# Patient Record
(demographics unavailable — no encounter records)

---

## 2025-04-15 NOTE — DISCUSSION/SUMMARY
[Normal Growth] : growth [Normal Development] : development  [No Elimination Concerns] : elimination [Continue Regimen] : feeding [No Skin Concerns] : skin [Normal Sleep Pattern] : sleep [None] : no medical problems [Anticipatory Guidance Given] : Anticipatory guidance addressed as per the history of present illness section [School] : school [Development and Mental Health] : development and mental health [Nutrition and Physical Activity] : nutrition and physical activity [Oral Health] : oral health [Safety] : safety [No Vaccines] : no vaccines needed [No Medications] : ~He/She~ is not on any medications [Patient] : patient [Parent/Guardian] : Parent/Guardian [Full Activity without restrictions including Physical Education & Athletics] : Full Activity without restrictions including Physical Education & Athletics [FreeTextEntry1] : Continue balanced diet with all food groups. Brush teeth twice a day with toothbrush. Recommend visit to dentist. Help child to maintain consistent daily routines and sleep schedule. Personal hygiene and puberty explained. School discussed. Ensure home is safe. Teach child about personal safety. Use consistent, positive discipline. Limit screen time to no more than 2 hours per day. Encourage physical activity. Return 1 year for routine well child check. 5-2-1-0 questionnaire reviewed and I discussed components of 5-2-1-0 healthy living with patient and family.  Recommended 5 servings of fruits and vegetables a day, less than 2 hours of screen time per day, 1 hour of exercise per day and zero sugar sweetened beverages. Parent(s) have no issues or concerns. Discussed in the preferred language of English Return 1 year for routine well child check.

## 2025-04-15 NOTE — HISTORY OF PRESENT ILLNESS
[Normal] : Normal [No] : No cigarette smoke exposure [FreeTextEntry1] : Patient is doing well - seen at  last night for LOM, on antibiotics Sees allergist for asthma and allergies, on multiple medications  Has not seen therapist for divorce issues, denies mental health concerns No reactions to previous vaccinations. Appetite good - eats a variety of foods. No new allergies reported Sleeping well with good sleeping patterns No recent severe illness or injury and no emergency room visits Not exposed to cigarette smoke Current thgthrthathdtheth:th th5th No problems in school identified -  no ADD/ADHD concerns. Activities: lax Coordination of Care reviewed, no issues Cardiac questionnaire reviewed, no issues Has seen dentist within last 12 months